# Patient Record
Sex: MALE | Race: WHITE | Employment: FULL TIME | URBAN - METROPOLITAN AREA
[De-identification: names, ages, dates, MRNs, and addresses within clinical notes are randomized per-mention and may not be internally consistent; named-entity substitution may affect disease eponyms.]

---

## 2022-03-07 ENCOUNTER — TRANSFERRED RECORDS (OUTPATIENT)
Dept: HEALTH INFORMATION MANAGEMENT | Facility: CLINIC | Age: 32
End: 2022-03-07

## 2022-11-03 NOTE — TELEPHONE ENCOUNTER
DIAGNOSIS: (R) shoulder, surgical consult / Dr. Julián Becker @ [facility] / [place] / MRI @ [facility], no previous surgery   APPOINTMENT DATE: 12.5.22   NOTES STATUS DETAILS   OFFICE NOTE from referring provider Care Everywhere  [facility]- sent to scan 11/10 10.31.22 Dr Julián Becker, [facility]  3.21.22   LABS     MRI PACS      Action 11.3.22 1:16 PM [facility]   Action Taken Faxed request to [facility] for images [phone]     Action 11.10.22 3:18 PM    Action Taken Received disc for mri 3.7.22 R shoulder images from [facility]. Gave disc to Luc for processing.     Received records for mri 3.7.22 R shoulder from [facility]. Sent to scan.

## 2022-11-21 DIAGNOSIS — M25.511 CHRONIC RIGHT SHOULDER PAIN: Primary | ICD-10-CM

## 2022-11-21 DIAGNOSIS — G89.29 CHRONIC RIGHT SHOULDER PAIN: Primary | ICD-10-CM

## 2022-12-01 NOTE — TELEPHONE ENCOUNTER
FUTURE VISIT INFORMATION      SURGERY INFORMATION:    Date: 12/8/22- Ortho    RECORDS REQUESTED FROM:         Pertinent Medical History: None

## 2022-12-02 DIAGNOSIS — G89.29 CHRONIC RIGHT SHOULDER PAIN: Primary | ICD-10-CM

## 2022-12-02 DIAGNOSIS — M25.511 CHRONIC RIGHT SHOULDER PAIN: Primary | ICD-10-CM

## 2022-12-05 ENCOUNTER — ANCILLARY PROCEDURE (OUTPATIENT)
Dept: GENERAL RADIOLOGY | Facility: CLINIC | Age: 32
End: 2022-12-05
Attending: ORTHOPAEDIC SURGERY
Payer: COMMERCIAL

## 2022-12-05 ENCOUNTER — TELEPHONE (OUTPATIENT)
Dept: ORTHOPEDICS | Facility: CLINIC | Age: 32
End: 2022-12-05

## 2022-12-05 ENCOUNTER — PRE VISIT (OUTPATIENT)
Dept: ORTHOPEDICS | Facility: CLINIC | Age: 32
End: 2022-12-05

## 2022-12-05 ENCOUNTER — OFFICE VISIT (OUTPATIENT)
Dept: ORTHOPEDICS | Facility: CLINIC | Age: 32
End: 2022-12-05
Payer: COMMERCIAL

## 2022-12-05 VITALS — WEIGHT: 193.7 LBS | BODY MASS INDEX: 26.24 KG/M2 | HEIGHT: 72 IN

## 2022-12-05 DIAGNOSIS — G89.29 CHRONIC RIGHT SHOULDER PAIN: Primary | ICD-10-CM

## 2022-12-05 DIAGNOSIS — G89.29 CHRONIC RIGHT SHOULDER PAIN: ICD-10-CM

## 2022-12-05 DIAGNOSIS — M25.511 CHRONIC RIGHT SHOULDER PAIN: ICD-10-CM

## 2022-12-05 DIAGNOSIS — M25.511 CHRONIC RIGHT SHOULDER PAIN: Primary | ICD-10-CM

## 2022-12-05 PROCEDURE — 99204 OFFICE O/P NEW MOD 45 MIN: CPT | Performed by: ORTHOPAEDIC SURGERY

## 2022-12-05 PROCEDURE — 73030 X-RAY EXAM OF SHOULDER: CPT | Mod: RT | Performed by: RADIOLOGY

## 2022-12-05 NOTE — TELEPHONE ENCOUNTER
Patient is scheduled for surgery with Dr. Godinez    Spoke with: Tomas    Date of Surgery: 12/8/22    Location: [facility]    Informed patient they will need an adult  yes    Pre op with Provider n/a    H&P: Scheduled with PAC 12/7/22    Pre-procedure COVID-19 Test: n/a    Additional imaging/appointments: POP scheduled    Surgery packet: Given in clinic     Additional comments: n/a

## 2022-12-05 NOTE — PROGRESS NOTES
CHIEF CONCERN:  Right shoulder instability.    HISTORY OF PRESENT ILLNESS:  Mr. Livingston is a very pleasant right hand dominant 32-year-old with history of pain and disability in his right shoulder.  His problem started in college when he was playing football.  He elected to not treat his labral tear, which was diagnosed at that time surgically as he would miss his senior season.  He was able to play through the season and then did okay until 02/2022.      At that time, he was working out at the gym, doing some skull pressures when he started to have increasing pain.  His arm was effectively unusable afterwards.  He saw another doctor who referred him to Dr. Julián Becker.  Dr. Becker recommended a course of physical therapy, oral anti-inflammatory medications and intra-articular injection.  He completed these and noted that the injection helped him briefly.  He notes he was still quite limited in terms of his ability to restore activities and function.    He notes that he has pain when he reaches across his body and he cannot sleep on that side.  It affects his ability to be active at the gym or in the outdoors.    IMAGING:  Radiographs, AP and lateral of glenohumeral joint and lateral scapula.  Indication shoulder pain.  Comparison views none.  Demonstrate no evidence of fracture, dislocation or abnormal calcific focus.    My review of his 03/07/2022 MRI scan showed an intact rotator cuff.  He has anterior inferior labral tear and superior labral tear with some mild posterior atypical appearance, but no evidence of marielle labral tearing.  He has some signal changes in the articular surface of his glenohumeral joint, but no evidence of full-thickness cartilage loss.  There is some mild AC arthritis.    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    He enjoys hiking weights, running.  He did have an MCL injury this  summer.    MEDICATIONS:  None.    ALLERGIES:  No known drug allergies.    HABITS:  He does not smoke.    FAMILY HISTORY:  Negative for dislocating shoulders, dislocating kneecaps, rotator cuff tears or rheumatoid arthritis.    PHYSICAL EXAMINATION:  He is a well-developed male in mild to moderate distress.  He articulates and communicates well with normal affect.  His breathing is unlabored.  Extraocular movements are grossly intact.  His sensation is intact in the axillary, musculocutaneous, median, radial and ulnar nerve distribution.  He has a warm and well perfused hand with palpable radial pulse.  He has normal iron sweat patterns without evidence of skin breakdown.  No swelling or palpable lymphadenopathy in the shoulder and arm region.    He has 170 degrees of active forward elevation, 170 degrees of lateral elevation, 60 degrees of external rotation at the side and internal rotation of the back to 10 with a normal liftoff.  He has 5/5 forward elevator and external rotator and abductor strength.  He has a positive [place] that improves with supination localizes anteriorly and recreates his symptoms.  He has abnormal Azul and Speed.  He has a normal Yergason.  He has true apprehension in abduction, external rotation, which improved with relocation test.  He has a normal jerk test.    ASSESSMENT:    1.  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  2.  Right superior labral tear.    PLAN:  I had a lengthy talk with Mr. Livingston today.  We talked at length about surgical and nonsurgical treatment of his shoulder.  He certainly has had the majority of what options I think make sense for him to have tried in terms of surgical treatment.  He has failed physical therapy, anti-inflammatory medications and intra-articular injection.  He continues to have lifestyle-limiting discomfort.      We talked about surgical remediation in the form of arthroscopic Bankart repair with capsulorrhaphy if indicated and  likely superior labral repair.  I told him there were no guarantees of surgery, he could be no better or even worse, he could get stiff, infected, need for further surgery, injury to nerves, arteries that power the hand and arm, a reaction to the anesthetic with damage to the heart, lungs, brain and even death.      He demonstrated a good understanding of this and wished to proceed with surgical scheduling.  I look forward to seeing him back on the date of his surgery.      Tima Godinez MD     cc:    Julián Becker MD  [facility]

## 2022-12-05 NOTE — LETTER
Date:December 6, 2022      Patient was self referred, no letter generated. Do not send.        [facility] Information

## 2022-12-05 NOTE — NURSING NOTE
"Reason For Visit:   Chief Complaint   Patient presents with     Consult     (R) shoulder, surgical consult / Dr. Julián Becker @ [facility]       PCP: No primary care provider on file.  Ref: / Julián Becker    ?  No  Occupation [employer].  Currently working? Yes.  Work status?  Full time.  Date of injury: Tore labrum in College playing football - played through and after season did not need to have surgery so pushed it off.  Pain started up in Feb 2022 at a work out class doing skull crushers and started to have increased pain.    Date of surgery: NA  Type of surgery: NA.  Smoker: No  Request smoking cessation information: No    Rigth hand dominant    SANE score  Affected shoulder: Right  Right shoulder SANE: 60  Left shoulder SANE: 100    Ht 1.835 m (6' 0.24\")   Wt 87.9 kg (193 lb 11.2 oz)   BMI 26.09 kg/m        Pain Assessment  Patient Currently in Pain: Yes  0-10 Pain Scale: 4  Primary Pain Location: Shoulder (Right)  Pain Descriptors: Other (comment), Dull (unstable)  Alleviating Factors: Rest  Aggravating Factors: Movement, Lying, Other (comment), Exercise (sleeping, worse in the AM)    Sandra Escalona ATC        "

## 2022-12-05 NOTE — NURSING NOTE
Teaching Flowsheet   Relevant Diagnosis: Right shoulder pain   Teaching Topic: RIGHT SHOULDER ARTHROSCOPY WITH ARTHROSCOPIC BICEPS TENODESIS, POSSIBLE LABRAL REPAIR     Person(s) involved in teaching:   Patient     Motivation Level:  Asks Questions: Yes  Eager to Learn: Yes  Cooperative: Yes  Receptive (willing/able to accept information): Yes  Any cultural factors/Yazidi beliefs that may influence understanding or compliance? No  Comments: none     Patient demonstrates understanding of the following:  Reason for the appointment, diagnosis and treatment plan: Yes  Knowledge of proper use of medications and conditions for which they are ordered (with special attention to potential side effects or drug interactions): Yes  Which situations necessitate calling provider and whom to contact: Yes       Teaching Concerns Addressed:   Comments: none     Proper use and care of (medical equip, care aids, etc.): Yes  Nutritional needs and diet plan: Yes  Pain management techniques: Yes  Wound Care: Yes  How and/when to access community resources: Yes     Instructional Materials Used/Given: surgery packet, stoplight tool, Ultrasling IV handout, chlorhexidine     Time spent with patient: 15 minutes.    Leia Valentine RN on 12/5/2022 at 2:00 PM

## 2022-12-05 NOTE — LETTER
12/5/2022         RE: Tomas Livingston  [address]  [place] [address]        Dear Colleague,    Thank you for referring your patient, Tomas Livingston, to the [facility] [place]. Please see a copy of my visit note below.    CHIEF CONCERN:  Right shoulder instability.    HISTORY OF PRESENT ILLNESS:  Mr. Livingston is a very pleasant right hand dominant 32-year-old with history of pain and disability in his right shoulder.  His problem started in college when he was playing football.  He elected to not treat his labral tear, which was diagnosed at that time surgically as he would miss his senior season.  He was able to play through the season and then did okay until 02/2022.      At that time, he was working out at the gym, doing some skull pressures when he started to have increasing pain.  His arm was effectively unusable afterwards.  He saw another doctor who referred him to Dr. Julián Becker.  Dr. Becker recommended a course of physical therapy, oral anti-inflammatory medications and intra-articular injection.  He completed these and noted that the injection helped him briefly.  He notes he was still quite limited in terms of his ability to restore activities and function.    He notes that he has pain when he reaches across his body and he cannot sleep on that side.  It affects his ability to be active at the gym or in the outdoors.    IMAGING:  Radiographs, AP and lateral of glenohumeral joint and lateral scapula.  Indication shoulder pain.  Comparison views none.  Demonstrate no evidence of fracture, dislocation or abnormal calcific focus.    My review of his 03/07/2022 MRI scan showed an intact rotator cuff.  He has anterior inferior labral tear and superior labral tear with some mild posterior atypical appearance, but no evidence of marielle labral tearing.  He has some signal changes in the articular surface of his glenohumeral joint, but no evidence of  full-thickness cartilage loss.  There is some mild AC arthritis.    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    He enjoys hiking weights, running.  He did have an MCL injury this summer.    MEDICATIONS:  None.    ALLERGIES:  No known drug allergies.    HABITS:  He does not smoke.    FAMILY HISTORY:  Negative for dislocating shoulders, dislocating kneecaps, rotator cuff tears or rheumatoid arthritis.    PHYSICAL EXAMINATION:  He is a well-developed male in mild to moderate distress.  He articulates and communicates well with normal affect.  His breathing is unlabored.  Extraocular movements are grossly intact.  His sensation is intact in the axillary, musculocutaneous, median, radial and ulnar nerve distribution.  He has a warm and well perfused hand with palpable radial pulse.  He has normal iron sweat patterns without evidence of skin breakdown.  No swelling or palpable lymphadenopathy in the shoulder and arm region.    He has 170 degrees of active forward elevation, 170 degrees of lateral elevation, 60 degrees of external rotation at the side and internal rotation of the back to 10 with a normal liftoff.  He has 5/5 forward elevator and external rotator and abductor strength.  He has a positive [place] that improves with supination localizes anteriorly and recreates his symptoms.  He has abnormal Azul and Speed.  He has a normal Yergason.  He has true apprehension in abduction, external rotation, which improved with relocation test.  He has a normal jerk test.    ASSESSMENT:    1.  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  2.  Right superior labral tear.    PLAN:  I had a lengthy talk with Mr. Livingston today.  We talked at length about surgical and nonsurgical treatment of his shoulder.  He certainly has had the majority of what options I think make sense for him to have tried in terms of surgical  treatment.  He has failed physical therapy, anti-inflammatory medications and intra-articular injection.  He continues to have lifestyle-limiting discomfort.      We talked about surgical remediation in the form of arthroscopic Bankart repair with capsulorrhaphy if indicated and likely superior labral repair.  I told him there were no guarantees of surgery, he could be no better or even worse, he could get stiff, infected, need for further surgery, injury to nerves, arteries that power the hand and arm, a reaction to the anesthetic with damage to the heart, lungs, brain and even death.      He demonstrated a good understanding of this and wished to proceed with surgical scheduling.  I look forward to seeing him back on the date of his surgery.      Tima Godinez MD     cc:    Julián Becker MD  [facility]            Again, thank you for allowing me to participate in the care of your patient.        Sincerely,        Tima Godinez MD

## 2022-12-07 ENCOUNTER — ANESTHESIA EVENT (OUTPATIENT)
Dept: SURGERY | Facility: AMBULATORY SURGERY CENTER | Age: 32
End: 2022-12-07
Payer: COMMERCIAL

## 2022-12-07 ENCOUNTER — VIRTUAL VISIT (OUTPATIENT)
Dept: SURGERY | Facility: CLINIC | Age: 32
End: 2022-12-07
Payer: COMMERCIAL

## 2022-12-07 ENCOUNTER — PRE VISIT (OUTPATIENT)
Dept: SURGERY | Facility: CLINIC | Age: 32
End: 2022-12-07

## 2022-12-07 DIAGNOSIS — G89.29 CHRONIC RIGHT SHOULDER PAIN: ICD-10-CM

## 2022-12-07 DIAGNOSIS — Z01.818 PREOP EXAMINATION: Primary | ICD-10-CM

## 2022-12-07 DIAGNOSIS — S43.431S LABRAL TEAR OF SHOULDER, RIGHT, SEQUELA: ICD-10-CM

## 2022-12-07 DIAGNOSIS — M25.511 CHRONIC RIGHT SHOULDER PAIN: ICD-10-CM

## 2022-12-07 PROCEDURE — 99202 OFFICE O/P NEW SF 15 MIN: CPT | Mod: 95 | Performed by: NURSE PRACTITIONER

## 2022-12-07 RX ORDER — FENTANYL CITRATE 50 UG/ML
25 INJECTION, SOLUTION INTRAMUSCULAR; INTRAVENOUS
Status: CANCELLED | OUTPATIENT
Start: 2022-12-07

## 2022-12-07 ASSESSMENT — ENCOUNTER SYMPTOMS: ORTHOPNEA: 0

## 2022-12-07 ASSESSMENT — COPD QUESTIONNAIRES: COPD: 0

## 2022-12-07 ASSESSMENT — PAIN SCALES - GENERAL: PAINLEVEL: MODERATE PAIN (4)

## 2022-12-07 ASSESSMENT — LIFESTYLE VARIABLES: TOBACCO_USE: 0

## 2022-12-07 NOTE — H&P
Pre-Operative H & P     CC:  Preoperative exam to assess for increased cardiopulmonary risk while undergoing surgery and anesthesia.    Date of Encounter: 12/7/2022  Primary Care Physician:  No primary care provider on file.     Reason for visit:   Encounter Diagnoses   Name Primary?     Preop examination Yes     Chronic right shoulder pain      Labral tear of shoulder, right, sequela        HPI  Tomas Livingston is a 32 year old male who presents for pre-operative H & P in preparation for  Procedure Information     Case: [id number] Date/Time: 12/08/22 1205    Procedure: RIGHT SHOULDER ARTHROSCOPY WITH ARTHROSCOPIC BICEPS TENODESIS, POSSIBLE LABRAL REPAIR (Right: Shoulder)    Anesthesia type: Choice with Block    Diagnosis: Chronic right shoulder pain [M25.511, G89.29]    Pre-op diagnosis: Chronic right shoulder pain [M25.511, G89.29]    Location: [place] [address] / [facility]    Providers: Tima Godinez MD          Tomas Livingston is a 32 year old male with no chronic medical conditions other than chronic right shoulder pain.  He has a know labral tear.  He injured his right shoulder while playing college football approximately 10 years ago.  He opted to defer surgical treatment at that time so he could complete the season.  Despite attempting treatment with physical therapy, an injection and NSAIDS he has continued to have pain and subsequent limitations with his physical activity. He has recently consulted with Dr. Godinez and the above listed surgery has been recommended for treatment.      History is obtained from the patient and chart review    Hx of abnormal bleeding or anti-platelet use: none      Past Medical History  Past Medical History:   Diagnosis Date     Chronic right shoulder pain        Past Surgical History  Past Surgical History:   Procedure Laterality Date     DENTAL SURGERY      front teeth repaired       Prior to Admission Medications  No current  outpatient medications on file.       Allergies  Allergies   Allergen Reactions     Penicillins Unknown and Other (See Comments)     Was told as a child          Social History  Social History     Socioeconomic History     Marital status:      Spouse name: Not on file     Number of children: 0     Years of education: Not on file     Highest education level: Not on file   Occupational History     Occupation: health care technology   Tobacco Use     Smoking status: Never     Smokeless tobacco: Never   Substance and Sexual Activity     Alcohol use: Yes     Alcohol/week: 3.0 standard drinks     Types: 3 Standard drinks or equivalent per week     Drug use: Never     Sexual activity: Not on file   Other Topics Concern     Not on file   Social History Narrative     Not on file     Social Determinants of Health     Financial Resource Strain: Not on file   Food Insecurity: Not on file   Transportation Needs: Not on file   Physical Activity: Not on file   Stress: Not on file   Social Connections: Not on file   Intimate Partner Violence: Not on file   Housing Stability: Not on file       Family History  Family History   Problem Relation Age of Onset     Hypertension Father      Other - See Comments Father         unspecified carotid disease     Anesthesia Reaction No family hx of      Thrombosis No family hx of        Review of Systems  The complete review of systems is negative other than noted in the HPI or here.   Anesthesia Evaluation   Pt has had prior anesthetic. Type: MAC.    No history of anesthetic complications       ROS/MED HX  ENT/Pulmonary:  - neg pulmonary ROS  (-) tobacco use, asthma, COPD, JACQUELINE risk factors and recent URI   Neurologic:  - neg neurologic ROS     Cardiovascular:     (+) -----No previous cardiac testing  (-) MATTHEWS and orthopnea/PND   METS/Exercise Tolerance: >4 METS Comment: Is active with hiking, HIIT, running and weight training.    Hematologic:  - neg hematologic  ROS  (-) history of blood  clots and history of blood transfusion   Musculoskeletal: Comment: Chronic right shoulder pain - labral tear      GI/Hepatic:  - neg GI/hepatic ROS     Renal/Genitourinary:  - neg Renal ROS     Endo:  - neg endo ROS     Psychiatric/Substance Use:  - neg psychiatric ROS     Infectious Disease:  - neg infectious disease ROS  (-) Recent Fever   Malignancy:  - neg malignancy ROS     Other:  - neg other ROS          Virtual visit -  No vitals were obtained    Physical Exam  Constitutional: Awake, alert, cooperative, no apparent distress, and appears stated age.  Eyes: Pupils equal  HENT: Normocephalic  Respiratory: non labored breathing   Neurologic: Awake, alert, oriented to name, place and time.   Neuropsychiatric: Calm, cooperative. Normal affect.      Labs/Diagnostic Studies   All labs and imaging personally reviewed     EKG/ stress test - none    Labs:  No recent labs.  Will order creatinine, K+ and hgb for DOS.    The patient's records and results personally reviewed by this provider.     Outside records reviewed from: limited scanned records      Assessment      Tomas Livingston is a 32 year old male seen as a PAC referral for risk assessment and optimization for anesthesia.    Plan/Recommendations  Pt will be optimized for the proposed procedure.  See below for details on the assessment, risk, and preoperative recommendations    NEUROLOGY  - No history of TIA, CVA or seizure    -Post Op delirium risk factors:  No risk identified    ENT  - No current airway concerns.  Will need to be reassessed day of surgery.  Mallampati: Unable to assess  TM: Unable to assess    CARDIAC  - no noted cardiac history  - METS (Metabolic Equivalents) = >4  Patient performs 4 or more METS exercise without symptoms            Total Score: 0      RCRI-Very low risk: Class 1 0.4% complication rate            Total Score: 0        PULMONARY    JACQUELINE Low Risk            Total Score: 0      - Denies asthma or inhaler use  - Tobacco History   "    History   Smoking Status     Never   Smokeless Tobacco     Never       GI  - denies GERD  PONV Medium Risk  Total Score: 2           1 AN PONV: Patient is not a current smoker    1 AN PONV: Intended Post Op Opioids            ENDOCRINE    - BMI: Estimated body mass index is 26.09 kg/m  as calculated from the following:    Height as of 12/5/22: 1.835 m (6' 0.24\").    Weight as of 12/5/22: 87.9 kg (193 lb 11.2 oz).  Overweight (BMI 25.0-29.9)  - No history of Diabetes Mellitus    HEME  VTE Low Risk 0.5%            Total Score: 2    VTE: Male      - No history of abnormal bleeding or antiplatelet use.      MSK  - right shoulder labral tear and chronic pain.  Surgery planned as above.        Different anesthesia methods/types have been discussed with the patient, but they are aware that the final plan will be decided by the assigned anesthesia provider on the date of service.      The patient is optimized for their procedure. AVS with information on surgery time/arrival time, meds and NPO status given by nursing staff. No further diagnostic testing indicated.    Virtual visit - Please refer to the physical examination documented by the anesthesiologist in the anesthesia record on the day of surgery.    Video-Visit Details    Type of service:  Video Visit    Provider received verbal consent for a Video Visit from the patient? Yes    Video Start Time: 1234  Video End Time (time video stopped): 1244    Originating Location (pt. Location): Home    Distant Location (provider location): Off-site    Mode of Communication:  Video Conference via [employer]     On the day of service:     Prep time: 11 minutes  Visit time: 10 minutes  Documentation time: 5 minutes  ------------------------------------------  Total time: 26 minutes      JAREN Leo CNP  Preoperative Assessment Center  [facility]  Clinic and Surgery Center  Phone: [phone]  Fax: [phone]  "

## 2022-12-07 NOTE — PATIENT INSTRUCTIONS
Preparing for Your Surgery      Name:  Tomas Livingston   MRN:  [id number]   :  1990   Today's Date:  2022         Arriving for surgery:  Surgery date:  2022  Arrival time:  10:30 AM    Restrictions due to COVID 19:    2 visitors may accompany each patient  Visitors may wait for patient in the Surgery Center Waiting room  All visitors must wear a mask and socially distance        parking is available for anyone with mobility limitations or disabilities. (Monday- Friday 7 am- 5 pm)    Please come to:    [facility]  [address] [place] [id number]    Check in on the 5th floor, Ambulatory Surgery Center.    What can I eat or drink?    -  You may eat and drink normally until 8 hours before arrival time  (Until 2:30 AM)  -  You may have clear liquids until 2 hours before arrival time  (Until 8:30 AM)    Examples of clear liquids:  Water  Clear broth  Juices (apple, white grape, white cranberry  and cider) without pulp  Noncarbonated, powder based beverages  (lemonade and Ismael-Aid)  Sodas (Sprite, 7-Up, ginger ale and seltzer)  Coffee or tea (without milk or cream)  Gatorade    --No alcohol for at least 24 hours before surgery    Which medicines can I take?    Hold Aspirin for 7 days before surgery.   Hold Multivitamins for 7 days before surgery.  Hold Supplements for 7 days before surgery.  Hold Ibuprofen (Advil, Motrin) for 1 day before surgery--unless otherwise directed by surgeon.  Hold Naproxen (Aleve) for 4 days before surgery.        How do I prepare myself?  - Please take 2 showers before surgery using Scrubcare or Hibiclens soap.    Use this soap only from the neck to your toes.     Leave the soap on your skin for one minute--then rinse thoroughly.      You may use your own shampoo and conditioner; no other hair products.   - Please remove all jewelry and body piercings.  - No lotions, deodorants or fragrance.  - No makeup or fingernail polish.   -  Bring your ID and insurance card.    -If you have a Deep Brain Stimulator, a Spinal Cord Stimulator or any implanted Neuro device you must bring the remote to the Surgery Center          ALL PATIENTS ARE REQUIRED TO HAVE A RESPONSIBLE ADULT TO DRIVE AND BE IN ATTENDANCE WITH THEM FOR 24 HOURS FOLLOWING SURGERY       Covid testing policy as of 12/06/2022  Your surgeon will notify and schedule you for a COVID test if one is needed before surgery--please direct any questions or COVID symptoms to your surgeon      Questions or Concerns:    -For questions regarding the day of surgery please contact the Ambulatory Surgery Center at [phone].    -If you have health changes between today and your surgery please contact your surgeon.     For questions after surgery please call your surgeons office.

## 2022-12-07 NOTE — PROGRESS NOTES
Tomas is a 32 year old who is being evaluated via a billable video visit.      How would you like to obtain your AVS? Email: [email or web address]    HPI         Review of Systems         Objective    Vitals - Patient Reported  Pain Score: Moderate Pain (4)        Physical Exam         IRINA Dumont LPN

## 2022-12-08 ENCOUNTER — HOSPITAL ENCOUNTER (OUTPATIENT)
Facility: AMBULATORY SURGERY CENTER | Age: 32
Discharge: HOME | End: 2022-12-08
Attending: ORTHOPAEDIC SURGERY
Payer: COMMERCIAL

## 2022-12-08 ENCOUNTER — ANESTHESIA (OUTPATIENT)
Dept: SURGERY | Facility: AMBULATORY SURGERY CENTER | Age: 32
End: 2022-12-08
Payer: COMMERCIAL

## 2022-12-08 VITALS
DIASTOLIC BLOOD PRESSURE: 72 MMHG | TEMPERATURE: 97.5 F | HEIGHT: 72 IN | WEIGHT: 193 LBS | HEART RATE: 78 BPM | BODY MASS INDEX: 26.14 KG/M2 | RESPIRATION RATE: 16 BRPM | SYSTOLIC BLOOD PRESSURE: 159 MMHG | OXYGEN SATURATION: 98 %

## 2022-12-08 DIAGNOSIS — M25.511 CHRONIC RIGHT SHOULDER PAIN: Primary | ICD-10-CM

## 2022-12-08 DIAGNOSIS — G89.29 CHRONIC RIGHT SHOULDER PAIN: Primary | ICD-10-CM

## 2022-12-08 LAB
CREAT SERPL-MCNC: 1.22 MG/DL (ref 0.67–1.17)
GFR SERPL CREATININE-BSD FRML MDRD: 81 ML/MIN/1.73M2
HGB BLD-MCNC: 15.6 G/DL (ref 13.3–17.7)
POTASSIUM SERPL-SCNC: 4 MMOL/L (ref 3.4–5.3)

## 2022-12-08 PROCEDURE — 85018 HEMOGLOBIN: CPT | Performed by: PATHOLOGY

## 2022-12-08 PROCEDURE — 29806 SHO ARTHRS SRG CAPSULORRAPHY: CPT | Mod: RT | Performed by: ORTHOPAEDIC SURGERY

## 2022-12-08 PROCEDURE — 84132 ASSAY OF SERUM POTASSIUM: CPT | Performed by: PATHOLOGY

## 2022-12-08 PROCEDURE — 29806 SHO ARTHRS SRG CAPSULORRAPHY: CPT | Mod: RT

## 2022-12-08 PROCEDURE — 29828 SHO ARTHRS SRG BICP TENODSIS: CPT | Mod: RT

## 2022-12-08 PROCEDURE — 82565 ASSAY OF CREATININE: CPT | Performed by: PATHOLOGY

## 2022-12-08 PROCEDURE — 29828 SHO ARTHRS SRG BICP TENODSIS: CPT | Mod: RT | Performed by: ORTHOPAEDIC SURGERY

## 2022-12-08 PROCEDURE — C9290 INJ, BUPIVACAINE LIPOSOME: HCPCS

## 2022-12-08 DEVICE — IMP ANCHOR ARTHREX BC SWVLK TENODESIS 6.25X19.1MM AR-1662BC: Type: IMPLANTABLE DEVICE | Site: SHOULDER | Status: FUNCTIONAL

## 2022-12-08 DEVICE — ANCHOR SUTURE KNOTLESS FIBERTAK 2 OD1.8 MM GEN2 AR-3636: Type: IMPLANTABLE DEVICE | Site: SHOULDER | Status: FUNCTIONAL

## 2022-12-08 RX ORDER — IBUPROFEN 600 MG/1
600 TABLET, FILM COATED ORAL
Qty: 21 TABLET | Refills: 0 | Status: SHIPPED | OUTPATIENT
Start: 2022-12-08

## 2022-12-08 RX ORDER — FENTANYL CITRATE 50 UG/ML
25-50 INJECTION, SOLUTION INTRAMUSCULAR; INTRAVENOUS
Status: DISCONTINUED | OUTPATIENT
Start: 2022-12-08 | End: 2022-12-08 | Stop reason: HOSPADM

## 2022-12-08 RX ORDER — FENTANYL CITRATE 50 UG/ML
50 INJECTION, SOLUTION INTRAMUSCULAR; INTRAVENOUS EVERY 5 MIN PRN
Status: DISCONTINUED | OUTPATIENT
Start: 2022-12-08 | End: 2022-12-09 | Stop reason: HOSPADM

## 2022-12-08 RX ORDER — ONDANSETRON 4 MG/1
4 TABLET, ORALLY DISINTEGRATING ORAL EVERY 30 MIN PRN
Status: DISCONTINUED | OUTPATIENT
Start: 2022-12-08 | End: 2022-12-09 | Stop reason: HOSPADM

## 2022-12-08 RX ORDER — DEXAMETHASONE SODIUM PHOSPHATE 4 MG/ML
INJECTION, SOLUTION INTRA-ARTICULAR; INTRALESIONAL; INTRAMUSCULAR; INTRAVENOUS; SOFT TISSUE PRN
Status: DISCONTINUED | OUTPATIENT
Start: 2022-12-08 | End: 2022-12-08

## 2022-12-08 RX ORDER — NALOXONE HYDROCHLORIDE 0.4 MG/ML
0.4 INJECTION, SOLUTION INTRAMUSCULAR; INTRAVENOUS; SUBCUTANEOUS
Status: DISCONTINUED | OUTPATIENT
Start: 2022-12-08 | End: 2022-12-08 | Stop reason: HOSPADM

## 2022-12-08 RX ORDER — FLUMAZENIL 0.1 MG/ML
0.2 INJECTION, SOLUTION INTRAVENOUS
Status: DISCONTINUED | OUTPATIENT
Start: 2022-12-08 | End: 2022-12-08 | Stop reason: HOSPADM

## 2022-12-08 RX ORDER — CEFAZOLIN SODIUM 2 G/50ML
2 SOLUTION INTRAVENOUS SEE ADMIN INSTRUCTIONS
Status: DISCONTINUED | OUTPATIENT
Start: 2022-12-08 | End: 2022-12-08 | Stop reason: HOSPADM

## 2022-12-08 RX ORDER — PROPOFOL 10 MG/ML
INJECTION, EMULSION INTRAVENOUS PRN
Status: DISCONTINUED | OUTPATIENT
Start: 2022-12-08 | End: 2022-12-08

## 2022-12-08 RX ORDER — SODIUM CHLORIDE, SODIUM LACTATE, POTASSIUM CHLORIDE, CALCIUM CHLORIDE 600; 310; 30; 20 MG/100ML; MG/100ML; MG/100ML; MG/100ML
INJECTION, SOLUTION INTRAVENOUS CONTINUOUS
Status: DISCONTINUED | OUTPATIENT
Start: 2022-12-08 | End: 2022-12-09 | Stop reason: HOSPADM

## 2022-12-08 RX ORDER — MEPERIDINE HYDROCHLORIDE 25 MG/ML
12.5 INJECTION INTRAMUSCULAR; INTRAVENOUS; SUBCUTANEOUS
Status: DISCONTINUED | OUTPATIENT
Start: 2022-12-08 | End: 2022-12-09 | Stop reason: HOSPADM

## 2022-12-08 RX ORDER — GABAPENTIN 300 MG/1
300 CAPSULE ORAL 3 TIMES DAILY
Qty: 30 CAPSULE | Refills: 0 | Status: SHIPPED | OUTPATIENT
Start: 2022-12-08

## 2022-12-08 RX ORDER — SODIUM CHLORIDE, SODIUM LACTATE, POTASSIUM CHLORIDE, CALCIUM CHLORIDE 600; 310; 30; 20 MG/100ML; MG/100ML; MG/100ML; MG/100ML
INJECTION, SOLUTION INTRAVENOUS CONTINUOUS
Status: DISCONTINUED | OUTPATIENT
Start: 2022-12-08 | End: 2022-12-08 | Stop reason: HOSPADM

## 2022-12-08 RX ORDER — HYDROMORPHONE HYDROCHLORIDE 1 MG/ML
0.4 INJECTION, SOLUTION INTRAMUSCULAR; INTRAVENOUS; SUBCUTANEOUS EVERY 5 MIN PRN
Status: DISCONTINUED | OUTPATIENT
Start: 2022-12-08 | End: 2022-12-09 | Stop reason: HOSPADM

## 2022-12-08 RX ORDER — LIDOCAINE HYDROCHLORIDE 20 MG/ML
INJECTION, SOLUTION INFILTRATION; PERINEURAL PRN
Status: DISCONTINUED | OUTPATIENT
Start: 2022-12-08 | End: 2022-12-08

## 2022-12-08 RX ORDER — FENTANYL CITRATE 50 UG/ML
INJECTION, SOLUTION INTRAMUSCULAR; INTRAVENOUS PRN
Status: DISCONTINUED | OUTPATIENT
Start: 2022-12-08 | End: 2022-12-08

## 2022-12-08 RX ORDER — ACETAMINOPHEN 325 MG/1
975 TABLET ORAL ONCE
Status: DISCONTINUED | OUTPATIENT
Start: 2022-12-08 | End: 2022-12-08 | Stop reason: HOSPADM

## 2022-12-08 RX ORDER — EPHEDRINE SULFATE 50 MG/ML
INJECTION, SOLUTION INTRAVENOUS PRN
Status: DISCONTINUED | OUTPATIENT
Start: 2022-12-08 | End: 2022-12-08

## 2022-12-08 RX ORDER — HYDROXYZINE PAMOATE 25 MG/1
25 CAPSULE ORAL 3 TIMES DAILY PRN
Qty: 30 CAPSULE | Refills: 0 | Status: SHIPPED | OUTPATIENT
Start: 2022-12-08

## 2022-12-08 RX ORDER — BUPIVACAINE HYDROCHLORIDE 2.5 MG/ML
INJECTION, SOLUTION EPIDURAL; INFILTRATION; INTRACAUDAL
Status: COMPLETED | OUTPATIENT
Start: 2022-12-08 | End: 2022-12-08

## 2022-12-08 RX ORDER — NALOXONE HYDROCHLORIDE 0.4 MG/ML
0.2 INJECTION, SOLUTION INTRAMUSCULAR; INTRAVENOUS; SUBCUTANEOUS
Status: DISCONTINUED | OUTPATIENT
Start: 2022-12-08 | End: 2022-12-08 | Stop reason: HOSPADM

## 2022-12-08 RX ORDER — ACETAMINOPHEN 325 MG/1
975 TABLET ORAL ONCE
Status: COMPLETED | OUTPATIENT
Start: 2022-12-08 | End: 2022-12-08

## 2022-12-08 RX ORDER — PROPOFOL 10 MG/ML
INJECTION, EMULSION INTRAVENOUS CONTINUOUS PRN
Status: DISCONTINUED | OUTPATIENT
Start: 2022-12-08 | End: 2022-12-08

## 2022-12-08 RX ORDER — FENTANYL CITRATE 50 UG/ML
25 INJECTION, SOLUTION INTRAMUSCULAR; INTRAVENOUS EVERY 5 MIN PRN
Status: DISCONTINUED | OUTPATIENT
Start: 2022-12-08 | End: 2022-12-09 | Stop reason: HOSPADM

## 2022-12-08 RX ORDER — OXYCODONE HYDROCHLORIDE 5 MG/1
1-2 CAPSULE ORAL EVERY 4 HOURS PRN
Qty: 22 CAPSULE | Refills: 0 | Status: SHIPPED | OUTPATIENT
Start: 2022-12-08

## 2022-12-08 RX ORDER — CEFAZOLIN SODIUM 2 G/50ML
2 SOLUTION INTRAVENOUS
Status: COMPLETED | OUTPATIENT
Start: 2022-12-08 | End: 2022-12-08

## 2022-12-08 RX ORDER — HYDROMORPHONE HYDROCHLORIDE 1 MG/ML
0.2 INJECTION, SOLUTION INTRAMUSCULAR; INTRAVENOUS; SUBCUTANEOUS EVERY 5 MIN PRN
Status: DISCONTINUED | OUTPATIENT
Start: 2022-12-08 | End: 2022-12-09 | Stop reason: HOSPADM

## 2022-12-08 RX ORDER — GABAPENTIN 300 MG/1
300 CAPSULE ORAL
Status: COMPLETED | OUTPATIENT
Start: 2022-12-08 | End: 2022-12-08

## 2022-12-08 RX ORDER — OXYCODONE HYDROCHLORIDE 5 MG/1
5 TABLET ORAL ONCE
Status: COMPLETED | OUTPATIENT
Start: 2022-12-08 | End: 2022-12-08

## 2022-12-08 RX ORDER — ONDANSETRON 2 MG/ML
INJECTION INTRAMUSCULAR; INTRAVENOUS PRN
Status: DISCONTINUED | OUTPATIENT
Start: 2022-12-08 | End: 2022-12-08

## 2022-12-08 RX ORDER — ONDANSETRON 2 MG/ML
4 INJECTION INTRAMUSCULAR; INTRAVENOUS EVERY 30 MIN PRN
Status: DISCONTINUED | OUTPATIENT
Start: 2022-12-08 | End: 2022-12-09 | Stop reason: HOSPADM

## 2022-12-08 RX ORDER — LIDOCAINE 40 MG/G
CREAM TOPICAL
Status: DISCONTINUED | OUTPATIENT
Start: 2022-12-08 | End: 2022-12-08 | Stop reason: HOSPADM

## 2022-12-08 RX ORDER — KETOROLAC TROMETHAMINE 30 MG/ML
INJECTION, SOLUTION INTRAMUSCULAR; INTRAVENOUS PRN
Status: DISCONTINUED | OUTPATIENT
Start: 2022-12-08 | End: 2022-12-08

## 2022-12-08 RX ADMIN — LIDOCAINE HYDROCHLORIDE 40 MG: 20 INJECTION, SOLUTION INFILTRATION; PERINEURAL at 13:30

## 2022-12-08 RX ADMIN — ONDANSETRON 4 MG: 2 INJECTION INTRAMUSCULAR; INTRAVENOUS at 13:43

## 2022-12-08 RX ADMIN — FENTANYL CITRATE 50 MCG: 50 INJECTION, SOLUTION INTRAMUSCULAR; INTRAVENOUS at 12:14

## 2022-12-08 RX ADMIN — PROPOFOL 200 MCG/KG/MIN: 10 INJECTION, EMULSION INTRAVENOUS at 13:30

## 2022-12-08 RX ADMIN — FENTANYL CITRATE 25 MCG: 50 INJECTION, SOLUTION INTRAMUSCULAR; INTRAVENOUS at 13:51

## 2022-12-08 RX ADMIN — DEXAMETHASONE SODIUM PHOSPHATE 4 MG: 4 INJECTION, SOLUTION INTRA-ARTICULAR; INTRALESIONAL; INTRAMUSCULAR; INTRAVENOUS; SOFT TISSUE at 13:43

## 2022-12-08 RX ADMIN — PROPOFOL 200 MG: 10 INJECTION, EMULSION INTRAVENOUS at 13:30

## 2022-12-08 RX ADMIN — Medication 50 MCG: at 14:05

## 2022-12-08 RX ADMIN — Medication 100 MCG: at 14:09

## 2022-12-08 RX ADMIN — FENTANYL CITRATE 25 MCG: 50 INJECTION, SOLUTION INTRAMUSCULAR; INTRAVENOUS at 13:28

## 2022-12-08 RX ADMIN — ACETAMINOPHEN 975 MG: 325 TABLET ORAL at 11:30

## 2022-12-08 RX ADMIN — Medication 50 MCG: at 13:50

## 2022-12-08 RX ADMIN — GABAPENTIN 300 MG: 300 CAPSULE ORAL at 11:30

## 2022-12-08 RX ADMIN — SODIUM CHLORIDE, SODIUM LACTATE, POTASSIUM CHLORIDE, CALCIUM CHLORIDE: 600; 310; 30; 20 INJECTION, SOLUTION INTRAVENOUS at 11:50

## 2022-12-08 RX ADMIN — FENTANYL CITRATE 50 MCG: 50 INJECTION, SOLUTION INTRAMUSCULAR; INTRAVENOUS at 14:40

## 2022-12-08 RX ADMIN — CEFAZOLIN SODIUM 2 G: 2 SOLUTION INTRAVENOUS at 13:21

## 2022-12-08 RX ADMIN — SODIUM CHLORIDE, SODIUM LACTATE, POTASSIUM CHLORIDE, CALCIUM CHLORIDE: 600; 310; 30; 20 INJECTION, SOLUTION INTRAVENOUS at 14:19

## 2022-12-08 RX ADMIN — OXYCODONE HYDROCHLORIDE 5 MG: 5 TABLET ORAL at 15:30

## 2022-12-08 RX ADMIN — BUPIVACAINE HYDROCHLORIDE 10 ML: 2.5 INJECTION, SOLUTION EPIDURAL; INFILTRATION; INTRACAUDAL at 12:14

## 2022-12-08 RX ADMIN — EPHEDRINE SULFATE 50 MG: 50 INJECTION, SOLUTION INTRAVENOUS at 14:05

## 2022-12-08 RX ADMIN — KETOROLAC TROMETHAMINE 30 MG: 30 INJECTION, SOLUTION INTRAMUSCULAR; INTRAVENOUS at 14:54

## 2022-12-08 RX ADMIN — Medication 100 MCG: at 14:02

## 2022-12-08 RX ADMIN — Medication 100 MCG: at 13:35

## 2022-12-08 ASSESSMENT — ENCOUNTER SYMPTOMS: ORTHOPNEA: 0

## 2022-12-08 ASSESSMENT — LIFESTYLE VARIABLES: TOBACCO_USE: 0

## 2022-12-08 ASSESSMENT — COPD QUESTIONNAIRES: COPD: 0

## 2022-12-08 NOTE — BRIEF OP NOTE
[facility] Brief Operative Note    Pre-operative diagnosis: Chronic right shoulder pain [M25.511, G89.29]   Post-operative diagnosis * No post-op diagnosis entered *  Labral tear, biceps disease   Procedure: Procedure(s):  RIGHT SHOULDER ARTHROSCOPY WITH ARTHROSCOPIC BICEPS TENODESIS, Anterior LABRAL REPAIR, SLAP repair   Surgeon(s): Surgeon(s) and Role:     * Tima Godinez MD - Primary     * German Baker MD - Resident - Assisting   Estimated blood loss: 25cc *    Specimens: * No specimens in log *   Findings: SLAP, anterior labral tear,

## 2022-12-08 NOTE — ANESTHESIA POSTPROCEDURE EVALUATION
Patient: Tomas Livingston    Procedure: Procedure(s):  RIGHT SHOULDER ARTHROSCOPY WITH ARTHROSCOPIC BICEPS TENODESIS, Anterior LABRAL REPAIR, SLAP repair       Anesthesia Type:  General    Note:  Disposition: Outpatient   Postop Pain Control: Uneventful            Sign Out: Well controlled pain   PONV: No   Neuro/Psych: Uneventful            Sign Out: Acceptable/Baseline neuro status   Airway/Respiratory: Uneventful            Sign Out: Acceptable/Baseline resp. status   CV/Hemodynamics: Uneventful            Sign Out: Acceptable CV status; No obvious hypovolemia; No obvious fluid overload   Other NRE: NONE   DID A NON-ROUTINE EVENT OCCUR? No           Last vitals:  Vitals Value Taken Time   /61 12/08/22 1530   Temp 36.1  C (96.9  F) 12/08/22 1515   Pulse 82 12/08/22 1530   Resp 16 12/08/22 1530   SpO2 98 % 12/08/22 1515       Electronically Signed By: Kimi Ayala MD  December 8, 2022  4:09 PM

## 2022-12-08 NOTE — ANESTHESIA PROCEDURE NOTES
Brachial plexus Procedure Note    Pre-Procedure   Staff -        Anesthesiologist:  Toña Rasheed MD       Performed By: anesthesiologist       Location: pre-op       Procedure Start/Stop Times: 12/8/2022 12:14 PM and 12/8/2022 12:25 PM       Pre-Anesthestic Checklist: patient identified, IV checked, site marked, risks and benefits discussed, informed consent, monitors and equipment checked, pre-op evaluation, at physician/surgeon's request and post-op pain management  Timeout:       Correct Patient: Yes        Correct Procedure: Yes        Correct Site: Yes        Correct Position: Yes        Correct Laterality: Yes        Site Marked: Yes  Procedure Documentation  Procedure: Brachial plexus       Diagnosis: POST OPERATIVE PAIN       Laterality: right       Patient Position: supine       Patient Prep/Sterile Barriers: sterile gloves, mask       Skin prep: Chloraprep (interscalene (superior trunk approach) approach).       Needle Type: short bevel and insulated       Needle Gauge: 21.        Needle Length (millimeters): 110        Ultrasound guided       1. Ultrasound was used to identify targeted nerve, plexus, vascular marker, or fascial plane and place a needle adjacent to it in real-time.       2. Ultrasound was used to visualize the spread of anesthetic in close proximity to the above referenced structure.       3. A permanent image is entered into the patient's record.    Assessment/Narrative         The placement was negative for: blood aspirated, painful injection and site bleeding       Paresthesias: No.       Bolus given via needle..        Secured via.        Insertion/Infusion Method: Single Shot       Complications: none       Injection made incrementally with aspirations every 5 mL.    Medication(s) Administered   Bupivacaine 0.25% PF (Infiltration) - Infiltration   10 mL - 12/8/2022 12:14:00 PM  Bupivacaine liposome (Exparel) 1.3% LA inj susp (Infiltration) - Infiltration   10 mL - 12/8/2022  "12:14:00 PM  Medication Administration Time: 12/8/2022 12:14 PM     Comments:  133mg liposomal bupivacaine via interscalene brachial plexus block    Discussed risks of nerve block, including nerve injury, bleeding, infection, incomplete analgesia. Discussed anticipated areas of sensory and motor block, limb precautions, and fall precautions. Discussed interscalene block side effects, including phrenic nerve palsy. Discussed alternative of not performing a nerve block. Ensured understanding, invited questions and all questions were answered. Patient wishes to proceed.    Informed consent was obtained.   Patient tolerated well. Interscalene brachial plexus block, superior trunk approach. Incremental aspiration every 5 mL. No paresthesia, no heme. Needle tip visualized throughout with appropriate spread of local anesthetic around brachial plexus. Intact motor function immediately following block.   Block was placed at the surgeon's request for post operative pain control.          FOR [facility] ([facility]) ONLY:   Pain Team Contact information: please page the Pain Team Via [employer]. Search \"Pain\". During daytime hours, please page the attending first. At night please page the resident first.    "

## 2022-12-08 NOTE — OP NOTE
"Procedure Date: 12/08/2022    PREOPERATIVE DIAGNOSES:    1.  Right shoulder labral tear.  2.  Right shoulder biceps disease.    POSTOPERATIVE DIAGNOSES:  1.  Right shoulder superior labral tear.  2.  Right shoulder anterior labral tear.  3.  Right shoulder biceps tenosynovitis.    SURGICAL PROCEDURE:  1.  Right shoulder arthroscopic biceps tenodesis.  2.  Right shoulder arthroscopic superior labral tear repair.  3.  Right shoulder anterior labral tear repair (arthroscopic Bankart).    SURGEON:  Tima Godinez M.D.    ESTIMATED BLOOD LOSS:  Less than 25 mL.    IMPLANTS:    1.  Arthrex knotless FiberTak x 2.  2.  Arthrex 6.25 mm BioComposite SwiveLock anchor x 1.    INDICATIONS:  Mr. Livingston is a very pleasant 32-year-old man with a history of pain and disability in his shoulder.  He had an extensive trial of nonsurgical management that was adequate for his funding agency to provide prior authorization for this fund of benefit.  After discussion of risks and benefits of surgical versus nonsurgical management, he elected to proceed with surgical remediation.    DESCRIPTION OF PROCEDURE:  The patient was positively identified in the preanesthesia care area, surgical site was initialed by me, and his consent was reviewed with him.  He was then taken to the operating theater where he was placed a well-padded beach chair position, prepped and draped in the usual sterile fashion for right upper extremity surgery.    Prior to surgical initiation, a \"timeout\" was held in accordance with hospital policy.  Verbal verification of delivery of prophylactic intravenous antibiotics was performed.    Examination under anesthesia showed that he had symmetric posterior subluxation of 1 cm with 1 cm of anterior and 1 cm sulcus.    After establishment of a posterior viewing portal, an internal portal was made under direct visualization.  Diagnostic arthroscopy was then possible.  Findings as follows:  The upper portion of the " subscapularis was intact.  The biceps was abnormal at its origin with a fully detached superior labrum and tenosynovitic as it entered the bicipital groove.  The pouch was synovitic but devoid of loose bodies.  There is no HAGL.  Posterior labrum was intact with some splitting but no fraying.  The articular surface of the humerus and the glenoid had some grade 2 changes diffusely throughout with a small focal area of grade 3 changes on the posterior superior aspect of the humeral head.  The undersurface of the supraspinatus was intact.    I tagged the biceps tendon twice with 0 PDS sutures and amputated off the anterosuperior labrum.  I debrided the anterior, superior and posterior julia.  I debrided the undersurface of the superior labral tear.  This was so floppy that was kicking down into the glenohumeral joint.  Consequently, I placed a knotless FiberTak through a percutaneous approach and fixed this so that it would no longer slip down into the glenohumeral joint itself.  As I did so, it was clear that he had degenerated Fishers complex anteriorly.  He did have an area of focal full-thickness cartilage loss that was about 3 x 4 mm on the anterior aspect, just at the distal most aspect of his Howie complex.  Consequently, I placed a knotless FiberTak there and used the anterior labral tear to fill this defect.  I was able to do so with an anterior labral tear by passing with a 25-degree suture passing device and pulling it down and effecting excellent reapproximation.  Care was taken not to do a capsulorrhaphy, as he did not have significant instability complaints preoperatively.    After this, I turned my attention to the subacromial space.    Upon entering the subacromial space, massive and significant bursitis was encountered.  I debrided this with a shaver and an ablator.  I preserved the CA ligament.  I viewed from the anterolateral portal and made a low anterolateral portal under direct visualization.   There was no evidence of full-thickness rotator cuff tearing from above.    I opened the transverse humeral ligament and brought the biceps tendon out through the low anterolateral portal and placed a rotating retrograde migratory Bennett stitch before amputating 4 cm of diseased tendon.  The biceps was very difficult to pull out of the low anterolateral portal because it was incarcerated in the bicipital groove with significant scarring.  Ultimately, I was able to free it up by working longitudinally along it and lysing some of these adhesions before I could pull it out.  It seems that this explained a significant amount of his preoperative symptoms.    I placed the biceps tendon deep into a 6.5 mm drill hole that I drilled in the inferior aspect of the bicipital groove and held with a 6.25 mm BioComposite SwiveLock anchor.  The rest of the contents of the bicipital groove were debrided of all soft tissues using a shaver and an ablator.    I was then able copiously irrigate and close with 3-0 interrupted Monocryl sutures.  Steri-Strips and a sterile nonadherent dressing were applied.  A sling was placed.    POSTOPERATIVE PLAN:  1.  The patient will be discharged home today on oral analgesics.  He should have no active or passive range of motion at this time, except for Codman exercises.   2.  At the 2-3 week zhanna, he will begin gentle progressive 4-quadrant stretching.  3.  Total sling time will be 4 weeks.  4.  Between 4 and 8 weeks, he can begin activities of daily living, but no lifting greater than 10 pounds.  5.  At the 8-week zhnana, he will begin unrestricted 4-quadrant stretching, periscapular stabilization and strengthening.  Radiographs should be obtained at that visit.    Tima Godinez MD        D: 2022   T: 2022   MT: [id number]    Name:     DEIDRE BREWER  MRN:      [id number]        Account:        [id number]   :      1990           Procedure Date: 2022      Document: [id number]

## 2022-12-08 NOTE — ANESTHESIA CARE TRANSFER NOTE
Patient: Tomas Livingston    Procedure: Procedure(s):  RIGHT SHOULDER ARTHROSCOPY WITH ARTHROSCOPIC BICEPS TENODESIS, Anterior LABRAL REPAIR, SLAP repair       Diagnosis: Chronic right shoulder pain [M25.511, G89.29]  Diagnosis Additional Information: No value filed.    Anesthesia Type:   General     Note:    Oropharynx: oropharynx clear of all foreign objects and spontaneously breathing  Level of Consciousness: awake  Oxygen Supplementation: room air    Independent Airway: airway patency satisfactory and stable  Dentition: dentition unchanged  Vital Signs Stable: post-procedure vital signs reviewed and stable  Report to RN Given: handoff report given  Patient transferred to: Phase II  Comments: Report to Phase II RN. Resps easy and regular.   Handoff Report: Identifed the Patient, Identified the Reponsible Provider, Reviewed the pertinent medical history, Discussed the surgical course, Reviewed Intra-OP anesthesia mangement and issues during anesthesia, Set expectations for post-procedure period and Allowed opportunity for questions and acknowledgement of understanding      Vitals:  Vitals Value Taken Time   /66    Temp 36.1  C (96.9  F) 12/08/22 1515   Pulse 83 12/08/22 1515   Resp 14 12/08/22 1515   SpO2 98 % 12/08/22 1515       Electronically Signed By: JAREN MOSQUEDA CRNA  December 8, 2022  3:19 PM

## 2022-12-08 NOTE — ANESTHESIA PREPROCEDURE EVALUATION
Anesthesia Pre-Procedure Evaluation    Patient: Tomas Livingston   MRN: [id number] : 1990        Procedure : Procedure(s):  RIGHT SHOULDER ARTHROSCOPY WITH ARTHROSCOPIC BICEPS TENODESIS, POSSIBLE LABRAL REPAIR          Past Medical History:   Diagnosis Date     Chronic right shoulder pain       Past Surgical History:   Procedure Laterality Date     DENTAL SURGERY      front teeth repaired      Allergies   Allergen Reactions     Penicillins Unknown and Other (See Comments)     Was told as a child         Social History     Tobacco Use     Smoking status: Never     Smokeless tobacco: Never   Substance Use Topics     Alcohol use: Yes     Alcohol/week: 3.0 standard drinks     Types: 3 Standard drinks or equivalent per week      Wt Readings from Last 1 Encounters:   22 87.9 kg (193 lb 11.2 oz)        Anesthesia Evaluation   Pt has had prior anesthetic. Type: MAC.    No history of anesthetic complications       ROS/MED HX  ENT/Pulmonary:    (-) tobacco use, asthma, COPD, JACQUELINE risk factors and recent URI   Neurologic:       Cardiovascular:    (-) MATTHEWS, orthopnea/PND and syncope   METS/Exercise Tolerance: >4 METS Comment: Is active with hiking, HIIT, running and weight training.    Hematologic:    (-) history of blood clots and history of blood transfusion   Musculoskeletal: Comment: Chronic right shoulder pain - labral tear      GI/Hepatic:    (-) GERD   Renal/Genitourinary:       Endo:       Psychiatric/Substance Use:       Infectious Disease:    (-) Recent Fever   Malignancy:       Other:            Physical Exam    Airway        Mallampati: I   TM distance: > 3 FB   Neck ROM: full   Mouth opening: > 3 cm    Respiratory Devices and Support         Dental         B=Bridge, C=Chipped, L=Loose, M=Missing    Cardiovascular          Rhythm and rate: regular and normal     Pulmonary           breath sounds clear to auscultation           OUTSIDE LABS:  CBC: No results found for: WBC, HGB, HCT, PLT  BMP: No results  found for: NA, POTASSIUM, CHLORIDE, CO2, BUN, CR, GLC  COAGS: No results found for: PTT, INR, FIBR  POC: No results found for: BGM, HCG, HCGS  HEPATIC: No results found for: ALBUMIN, PROTTOTAL, ALT, AST, GGT, ALKPHOS, BILITOTAL, BILIDIRECT, BAUDILIO  OTHER: No results found for: PH, LACT, A1C, MILENA, PHOS, MAG, LIPASE, AMYLASE, TSH, T4, T3, CRP, SED    Anesthesia Plan    ASA Status:  1   NPO Status:  NPO Appropriate    Anesthesia Type: General.     - Airway: LMA   Induction: Intravenous.   Maintenance: Balanced.        Consents    Anesthesia Plan(s) and associated risks, benefits, and realistic alternatives discussed. Questions answered and patient/representative(s) expressed understanding.    - Discussed:     - Discussed with:  Patient         Postoperative Care    Pain management: IV analgesics, Oral pain medications, Peripheral nerve block (Single Shot).   PONV prophylaxis: Ondansetron (or other 5HT-3), Dexamethasone or Solumedrol, Background Propofol Infusion     Comments:    Other Comments: Preoperative interscalene brachial plexus block with liposomal bupivacaine, per surgeon request. Discussed risks of nerve block, including nerve injury, bleeding, infection, incomplete analgesia. Discussed anticipated areas of sensory and motor block, limb precautions, and fall precautions.  Discussed interscalene block side effects, including phrenic nerve palsy. Discussed alternative of not performing a nerve block. Ensured understanding, invited questions and all questions were answered. Patient wishes to proceed.    Discussed risks of general anesthesia, including aspiration pneumonia, sore throat/hoarse voice, abrasions/damage to lips/tongue/teeth, nausea, rare complications (including medication reactions, cardiac, pulmonary). Ensured understanding, invited questions and all questions were answered. Patient wishes to proceed.       H&P reviewed: Unable to attach H&P to encounter due to EHR limitations. H&P Update:  appropriate H&P reviewed, patient examined. No interval changes since H&P (within 30 days).         Toña Rasheed MD

## 2022-12-08 NOTE — DISCHARGE INSTRUCTIONS
"[facility]  Home Care Following Anesthesia  For 24 hours after surgery:  Get plenty of rest.  A responsible adult must stay with you for at least 24 hours after you leave the surgery center.  Do not drive or use heavy equipment.  If you have weakness or tingling, don't drive or use heavy equipment until this feeling goes away.   Do not drink alcohol.   Avoid strenuous or risky activities.  Ask for help when climbing stairs.  You may feel lightheaded.  IF so, sit for a few minutes before standing.  Have someone help you get up.   If you have nausea (feel sick to your stomach): Drink only clear liquids such as apple juice, ginger ale, broth or 7-Up.  Rest may also help.  Be sure to drink enough fluids.  Move to a regular diet as you feel able.   You may have a slight fever.  Call the doctor if your fever is over 100 F (37.7 C) (taken under the tongue) or lasts longer than 24 hours.  You may have a dry mouth, a sore throat, muscle aches or trouble sleeping. These should go away after 24 hours.  Do not make important or legal decisions.   It is recommended to avoid smoking.      Today you received an Exparel block to numb the nerves near your surgery site.  This is a block using local anesthetic or \"numbing\" medication injected around the nerves to anesthetize or \"numb\" the area supplied by those nerves.  This block is injected into the muscle layer near your surgical site.  This medication may numb the location where you had surgery up to 72 hours.  If your surgical site is an arm or leg you should be careful with your affected limb, since it is possible to injure your limb without being aware of it due to the numbing.  Until full feeling returns, you should guard against bumping or hitting your limb, and avoid extreme hot or cold temperatures on the skin.  As the block wears off, the feeling will return as a tingling or prickly sensation near your surgical site.  You will " experince more discomfort from your incision as the feeling returns.  You may want to take a pain pill (a narcotic or Tylenol if this was prescribed by your surgeon) when you start to experience mild pain before the pain beomes more severe.  If your pain medications do not control your pain, you should notify your surgeon.    Tips for taking pain medications  To get the best pain relief possible, remember these points:  Take pain medications as directed, before pain becomes severe.  Pain medication can upset your stomach: taking it with food may help.  Constipation is a common side effect of pain medication. Drink plenty of  fluids.  Eat foods high in fiber. Take a stool softener if recommended by your doctor or pharmacist.  Do not drink alcohol, drive or operate machinery while taking pain medications.  Ask about other ways to control pain, such as with heat, ice or relaxation.    Tylenol/Acetaminophen Consumption  To help encourage the safe use of acetaminophen, the makers of TYLENOL  have lowered the maximum daily dose for single-ingredient Extra Strength TYLENOL  (acetaminophen) products sold in the [place] from 8 pills per day (4,000 mg) to 6 pills per day (3,000 mg). The dosing interval has also changed from 2 pills every 4-6 hours to 2 pills every 6 hours.  If you feel your pain relief is insufficient, you may take Tylenol/Acetaminophen in addition to your narcotic pain medication.   Be careful not to exceed 3,000 mg of Tylenol/Acetaminophen in a 24 hour period from all sources.  If you are taking extra strength Tylenol/acetaminophen (500 mg), the maximum dose is 6 tablets in 24 hours.  If you are taking regular strength acetaminophen (325 mg), the maximum dose is 9 tablets in 24 hours.  You received 975 mg of tylenol at 11:30 AM, next dose available after 5:30 PM- then just follow the package instructions    Call a doctor for any of the following:  Signs of infection (fever, growing tenderness at the surgery  site, a large amount of drainage or bleeding, severe pain, foul-smelling drainage, redness, swelling).  It has been over 8 to 10 hours since surgery and you are still not able to urinate (pass water).  Headache for over 24 hours.  Signs of Covid-19 infection (temperature over 100 degrees, shortness of breath, cough, loss of taste/smell, generalized body aches, persistent headache, chills, sore throat, nausea/vomiting/diarrhea)  Your doctor is:  Dr. Tima Godinez, Orthopaedics: [phone]                Or dial [phone] and ask for the resident on call for:  Orthopaedics  For emergency care, call the:  [facility] Emergency Department: [phone] (TTY for hearing impaired: [phone])

## 2022-12-08 NOTE — RESULT ENCOUNTER NOTE
Adolph Diez-  I just realized that Tomas doesn't have MyChart.  When you are able, can you please call him with the below message?    Thanks!  Reva Marin,    Your test results are attached.  Your labs in pre-op today showed a slightly elevated creatinine level (kidney lab).  It may have been related to some dehydration.  I would recommend that you follow up with your primary care provider for a recheck when you can hydrate well beforehand.        Reva Mujica DNP, RN, ANP-C

## 2022-12-08 NOTE — ANESTHESIA PROCEDURE NOTES
Airway       Patient location during procedure: OR  Staff -        CRNA: Nhi Guthrie APRN CRNA       Performed By: CRNAIndications and Patient Condition       Indications for airway management: jeana-procedural       Induction type:intravenous       Mask difficulty assessment: 1 - vent by mask    Final Airway Details       Final airway type: supraglottic airway    Supraglottic Airway Details        Type: LMA       Brand: I-Gel       LMA size: 5    Post intubation assessment        Placement verified by: capnometry, equal breath sounds and chest rise        Number of attempts at approach: 1       Number of other approaches attempted: 0       Secured with: silk tape       Ease of procedure: easy       Dentition: Intact and Unchanged

## 2022-12-08 NOTE — OR NURSING
Patient received right side Brachial Plexus nerve block  with Exparel.  Fentanyl 50mcg and Versed 1mg given. Tolerated procedure well.

## 2022-12-09 ENCOUNTER — TELEPHONE (OUTPATIENT)
Dept: SURGERY | Facility: CLINIC | Age: 32
End: 2022-12-09

## 2022-12-09 NOTE — TELEPHONE ENCOUNTER
Updated Tomas of his lab results.  Per Reva Mujica NP: labs showed slightly elevated creatinine level, may have been related to some dehydration.  Recommend follow up with PCP for a recheck and hydrate well before hand. Tomas expressed understanding.  Georgie Rao RN

## 2022-12-30 ENCOUNTER — OFFICE VISIT (OUTPATIENT)
Dept: ORTHOPEDICS | Facility: CLINIC | Age: 32
End: 2022-12-30
Payer: COMMERCIAL

## 2022-12-30 DIAGNOSIS — G89.29 CHRONIC RIGHT SHOULDER PAIN: Primary | ICD-10-CM

## 2022-12-30 DIAGNOSIS — M25.511 CHRONIC RIGHT SHOULDER PAIN: Primary | ICD-10-CM

## 2022-12-30 PROCEDURE — 99024 POSTOP FOLLOW-UP VISIT: CPT | Performed by: PHYSICIAN ASSISTANT

## 2022-12-30 NOTE — LETTER
Date:January 2, 2023      Patient was self referred, no letter generated. Do not send.        [facility] Health Information

## 2022-12-30 NOTE — NURSING NOTE
Reason For Visit:   Chief Complaint   Patient presents with     Surgical Followup     Post op right shoulder Arthroscopic Tenodesis, Superior Labral repair, Anterior Labral repair (Bankart)  DOS: 12/8/22       Primary MD: No primary care provider on file.  Ref. MD: Summer    Age: 32 year old    ?  No      There were no vitals taken for this visit.      Pain Assessment  Patient Currently in Pain: Yes  0-10 Pain Scale: 3 (at rest  7/10 with odd movements)  Primary Pain Location: Shoulder (right)    Hand Dominance Evaluation  Hand Dominance: Right          QuickDASH Assessment  No flowsheet data found.       Current Outpatient Medications   Medication Sig Dispense Refill     gabapentin (NEURONTIN) 300 MG capsule Take 1 capsule (300 mg) by mouth 3 times daily 30 capsule 0     hydrOXYzine (VISTARIL) 25 MG capsule Take 1 capsule (25 mg) by mouth 3 times daily as needed for itching 30 capsule 0     ibuprofen (ADVIL/MOTRIN) 600 MG tablet Take 1 tablet (600 mg) by mouth 3 times daily (with meals) 21 tablet 0     oxyCODONE (OXY-IR) 5 MG capsule Take 1-2 capsules (5-10 mg) by mouth every 4 hours as needed for severe pain (7-10) 22 capsule 0       Allergies   Allergen Reactions     Penicillins Unknown and Other (See Comments)     Was told as a child          DAYNE THAKUR, ATC

## 2022-12-30 NOTE — PATIENT INSTRUCTIONS
Please schedule a physical therapy appointment. Start 4 gentle progressive 4-quadrant stretching. Continue sling until 4 weeks post op (1/5/23).     At 4 weeks post op my discontinue sling. Between 4 and 8 weeks, you can begin activities of daily living, but no lifting greater than 10 pounds.     Follow up in 1/25/23 with Ana Zamorano for recheck. At that time we will get X-rays and you will begin unrestricted 4-quadrant stretching, periscapular stabilization and strengthening.

## 2022-12-30 NOTE — PROGRESS NOTES
Date of Service: Dec 30, 2022    Chief Complaint: Post operative follow up.     Date of Surgery: 12/8/2022    Procedure Performed:   1.  Right shoulder arthroscopic biceps tenodesis.  2.  Right shoulder arthroscopic superior labral tear repair.  3.  Right shoulder anterior labral tear repair (arthroscopic Bankart).     Interval events: Tomas Livingston is a 32 year old male who presents today for a postoperative follow up.  Patient reports he is doing well.  He has been wearing his sling at all times except for hygiene.  He is doing gentle pendulum exercises.  Patient did report that he recently got a new puppy and there have been a few instances where he reflexively used his right arm, he has had some soreness and tightness in the mid biceps muscle belly.  Denies any numbness or tingling.  He has not yet seen physical therapy.    The past medical history was reviewed updated in the EMR. This includes medications, surgeries, social history, and review of systems.    Physical examination:  Well-developed, well-nourished and in no acute distress.  Alert and oriented to surroundings.  On examination of the right shoulder, incision is well-healed. There is no erythema, drainage, or dehiscence. Sensation is intact in median, radial and ulnar nerve distributions. Patient can actively flex and extend all digits and thumb. Fingers are warm and well-perfused.     Assessment: 32 year old male s/p the above procedures, progressing appropriately.     Plan:    -Physical therapy referral placed.  Encourage patient to schedule as soon as possible.  - Patient may initiate gentle progressive 4-quadrant stretching.  Handout given for four-quadrant stretching.  - He should continue with the sling until 4 weeks post op.   - Between 4 and 8 weeks, he can begin activities of daily living, but no lifting greater than 10 pounds.   - Patient will follow up at 8 weeks post op with XRs of the shoulder obtained at that time. At that time he  may begin unrestricted 4-quadrant stretching, periscapular stabilization and strengthening.  -Follow-up on 1/5/2023 with Ana Regan for recheck.  Knows to contact us in the interim with any questions or concerns.    GIANA JOHNSON PA-C  December 30, 2022 9:14 AM   Orthopaedic Surgery

## 2022-12-30 NOTE — LETTER
12/30/2022         RE: Tomas Livingston  [address]  [place] [address]        Dear Colleague,    Thank you for referring your patient, Tomas Livingston, to the [facility] [place]. Please see a copy of my visit note below.    Date of Service: Dec 30, 2022    Chief Complaint: Post operative follow up.     Date of Surgery: 12/8/2022    Procedure Performed:   1.  Right shoulder arthroscopic biceps tenodesis.  2.  Right shoulder arthroscopic superior labral tear repair.  3.  Right shoulder anterior labral tear repair (arthroscopic Bankart).     Interval events: Tomas Livingston is a 32 year old male who presents today for a postoperative follow up.  Patient reports he is doing well.  He has been wearing his sling at all times except for hygiene.  He is doing gentle pendulum exercises.  Patient did report that he recently got a new puppy and there have been a few instances where he reflexively used his right arm, he has had some soreness and tightness in the mid biceps muscle belly.  Denies any numbness or tingling.  He has not yet seen physical therapy.    The past medical history was reviewed updated in the EMR. This includes medications, surgeries, social history, and review of systems.    Physical examination:  Well-developed, well-nourished and in no acute distress.  Alert and oriented to surroundings.  On examination of the right shoulder, incision is well-healed. There is no erythema, drainage, or dehiscence. Sensation is intact in median, radial and ulnar nerve distributions. Patient can actively flex and extend all digits and thumb. Fingers are warm and well-perfused.     Assessment: 32 year old male s/p the above procedures, progressing appropriately.     Plan:    -Physical therapy referral placed.  Encourage patient to schedule as soon as possible.  - Patient may initiate gentle progressive 4-quadrant stretching.  Handout given for four-quadrant  stretching.  - He should continue with the sling until 4 weeks post op.   - Between 4 and 8 weeks, he can begin activities of daily living, but no lifting greater than 10 pounds.   - Patient will follow up at 8 weeks post op with XRs of the shoulder obtained at that time. At that time he may begin unrestricted 4-quadrant stretching, periscapular stabilization and strengthening.  -Follow-up on 1/5/2023 with Ana Regan for recheck.  Knows to contact us in the interim with any questions or concerns.    TIKI JOHNSON PA-C  December 30, 2022 9:14 AM   Orthopaedic Surgery        Again, thank you for allowing me to participate in the care of your patient.        Sincerely,        Tiki Johnson PA-C

## 2023-01-05 ENCOUNTER — THERAPY VISIT (OUTPATIENT)
Dept: PHYSICAL THERAPY | Facility: CLINIC | Age: 33
End: 2023-01-05
Attending: PHYSICIAN ASSISTANT
Payer: COMMERCIAL

## 2023-01-05 DIAGNOSIS — G89.29 CHRONIC RIGHT SHOULDER PAIN: ICD-10-CM

## 2023-01-05 DIAGNOSIS — M25.511 CHRONIC RIGHT SHOULDER PAIN: ICD-10-CM

## 2023-01-05 PROCEDURE — 97110 THERAPEUTIC EXERCISES: CPT | Mod: GP | Performed by: PHYSICAL THERAPIST

## 2023-01-05 PROCEDURE — 97161 PT EVAL LOW COMPLEX 20 MIN: CPT | Mod: GP | Performed by: PHYSICAL THERAPIST

## 2023-01-05 NOTE — PROGRESS NOTES
[facility] Initial Evaluation     Present: The history is provided by the patient. No  was used.      Subjective:  Tomas Livingston is a 32 year old male who present to PT s/p right anthroscopic surgery to repair anterior and superior labrum with biceps tenodesis on 12/8/22. Pt reports that he wore the sling for the first 4 weeks, today is his first day without it.     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 Location of symptoms: right shoulder. Pain level on number scale: 4/10. Quality of pain: achy, tired, sore. Associated symptoms: denies numbness/tingling. Pain frequency (constant/intermittent): intermittent. Symptoms are exacerbated by: movement, activity. Symptoms are relieved by: movement. Progression of symptoms since onset: improving. Imaging: see Epic. Previous treatment/response: PT previously for shoulder.      General health as reported by patient is good. Pertinent medical history includes: See Epic. Medical allergies: see Epic. Other pertinent surgeries: see Epic. Current medications: See Epic.     Occupation: Healthcare IT sales, artificial intelligence. Primary job tasks: computer work, works from home with travel. Barriers at home/work: None reported by patient.     Social history: HIIT classes - [employer]; travels for work; right-handed, sleeps on right side    Red flags:  None reported by patient.    Objective  Observation:  Posture: bilateral rounded/protracted shoulders    Scapular positioning: WFL    *=painful    Shoulder AROM (* = pain) Right Left   FL 80*        160   ABD    ER/HBH Right ear T4   IR/HBB Right hip L2     Shoulder Strength: Not tested due to 4-weeks status post surgery      Key Findings  Patient presents to physical therapy following right shoulder anterior and superior labral repairs with biceps tenodesis on 12/8/22;  decreased strength and ROM consistent with status post surgery      Assessment/Plan:    Patient is a 32 year old male with right side shoulder complaints.    Patient has the following significant findings with corresponding treatment plan.                Diagnosis 1:  Right shoulder  Pain -  self management, education and home program  Decreased ROM/flexibility - manual therapy, therapeutic exercise, therapeutic activity and home program    Therapy Evaluation Codes:     Cumulative Therapy Evaluation is: Low complexity.    Previous and current functional limitations:  (See Goal Flow Sheet for this information)    Short term and Long term goals: (See Goal Flow Sheet for this information)     Communication ability:  Patient appears to be able to clearly communicate and understand verbal and written communication and follow directions correctly.  Treatment Explanation - The following has been discussed with the patient:   RX ordered/plan of care  Anticipated outcomes  Possible risks and side effects  This patient would benefit from PT intervention to resume normal activities.   Rehab potential is good.    Frequency:  1 X week, once daily; decreasing to every other week as symptoms improve and patient becomes more independent with HEP  Duration:  for 12 weeks  Discharge Plan:  Achieve all LTG.  Independent in home treatment program.  Reach maximal therapeutic benefit.    Please refer to the daily flowsheet for treatment today, total treatment time and time spent performing 1:1 timed codes.     Please Contact me with any questions or concerns. Thank you for your time and entrusting me with your care.     Osmani Olivares, PT, DPT, OCS, CSMT  Physical Therapist  [facility]  [phone]

## 2023-01-12 ENCOUNTER — THERAPY VISIT (OUTPATIENT)
Dept: PHYSICAL THERAPY | Facility: CLINIC | Age: 33
End: 2023-01-12
Attending: PHYSICIAN ASSISTANT
Payer: COMMERCIAL

## 2023-01-12 DIAGNOSIS — M25.511 RIGHT SHOULDER PAIN: Primary | ICD-10-CM

## 2023-01-12 PROCEDURE — 97110 THERAPEUTIC EXERCISES: CPT | Mod: GP | Performed by: PHYSICAL THERAPIST

## 2023-01-13 DIAGNOSIS — M25.511 CHRONIC RIGHT SHOULDER PAIN: Primary | ICD-10-CM

## 2023-01-13 DIAGNOSIS — G89.29 CHRONIC RIGHT SHOULDER PAIN: Primary | ICD-10-CM

## 2023-01-19 ENCOUNTER — THERAPY VISIT (OUTPATIENT)
Dept: PHYSICAL THERAPY | Facility: CLINIC | Age: 33
End: 2023-01-19
Attending: PHYSICIAN ASSISTANT
Payer: COMMERCIAL

## 2023-01-19 DIAGNOSIS — M25.511 ACUTE PAIN OF RIGHT SHOULDER: Primary | ICD-10-CM

## 2023-01-19 PROCEDURE — 97110 THERAPEUTIC EXERCISES: CPT | Mod: GP | Performed by: PHYSICAL THERAPIST

## 2023-01-25 ENCOUNTER — ANCILLARY PROCEDURE (OUTPATIENT)
Dept: GENERAL RADIOLOGY | Facility: CLINIC | Age: 33
End: 2023-01-25
Payer: COMMERCIAL

## 2023-01-25 ENCOUNTER — OFFICE VISIT (OUTPATIENT)
Dept: ORTHOPEDICS | Facility: CLINIC | Age: 33
End: 2023-01-25
Payer: COMMERCIAL

## 2023-01-25 DIAGNOSIS — G89.29 CHRONIC RIGHT SHOULDER PAIN: ICD-10-CM

## 2023-01-25 DIAGNOSIS — Z48.89 ENCOUNTER FOR POSTOPERATIVE CARE: Primary | ICD-10-CM

## 2023-01-25 DIAGNOSIS — M25.511 CHRONIC RIGHT SHOULDER PAIN: ICD-10-CM

## 2023-01-25 PROCEDURE — 73030 X-RAY EXAM OF SHOULDER: CPT | Mod: RT | Performed by: RADIOLOGY

## 2023-01-25 PROCEDURE — 99024 POSTOP FOLLOW-UP VISIT: CPT

## 2023-01-25 NOTE — PROGRESS NOTES
Chief Complaint:   1. Follow up, DOS 12/8/23 with Dr. Godinez    Procedures:  1.  Right shoulder arthroscopic biceps tenodesis.  2.  Right shoulder arthroscopic superior labral tear repair.  3.  Right shoulder anterior labral tear repair (arthroscopic Bankart).     Subjective:  Tomas Livingston is ~6 weeks s/p above procedures, here for follow up. He has done very well since surgery. Pain was well controlled and had regained nearly full ROM until he had a fall one week ago. Notes he has a new puppy and fell on the ice with an outstretched right arm. Endorses R anterior shoulder pain/soreness since the fall, he is still able to perform all ADLs. Has not noticed any deformity. Has not needed any medication for pain control. Has been working with PT weekly. No additional concerns today.       Exam:     General: Awake, Alert, and oriented. Articulates and communicates with a normal affect    Right Upper Extremity:  -Arthroscopic incisions well healed  -No visible deformity  -Slight tenderness to palpation over bicipital groove and pec, no AC joint tenderness   - 150 degrees active forward elevation, 160 degrees lateral elevation, 45 degrees of external rotation   -5/5 strength with forward elevation, external rotation, and abduction   -SILT median, ulnar, radial, and axillary nerve distributions   -Fingers warm and well perfused      Imaging:  Radiographs of the right shoulder from 1/25/23 were independently reviewed by me and findings were discussed with the patient today. The imaging demonstrates post op changes from bicep tenodesis, negative for fracture or dislocation.    Medications:     Current Outpatient Medications:      gabapentin (NEURONTIN) 300 MG capsule, Take 1 capsule (300 mg) by mouth 3 times daily (Patient not taking: Reported on 1/25/2023), Disp: 30 capsule, Rfl: 0     hydrOXYzine (VISTARIL) 25 MG capsule, Take 1 capsule (25 mg) by mouth 3 times daily as needed for itching (Patient not taking: Reported  on 1/25/2023), Disp: 30 capsule, Rfl: 0     ibuprofen (ADVIL/MOTRIN) 600 MG tablet, Take 1 tablet (600 mg) by mouth 3 times daily (with meals) (Patient not taking: Reported on 1/25/2023), Disp: 21 tablet, Rfl: 0     oxyCODONE (OXY-IR) 5 MG capsule, Take 1-2 capsules (5-10 mg) by mouth every 4 hours as needed for severe pain (7-10) (Patient not taking: Reported on 1/25/2023), Disp: 22 capsule, Rfl: 0    Assessment:  Doing well ~6 weeks weeks s/p R shoulder arthroscopic biceps tenodesis, superior and anterior labral repair with Dr. Godinez. Slight set back in recovery d/t fall last week,  recommended against advancing activity until returned to baseline. We discussed the plan below, all questions were answered to the best of my ability.     Plan:   -ADLs, no lifting greater than 10 lbs for 1 more week  -Advance to unrestricted 4-quadrant stretching, periscapular stabilization, and strengthening at 8 week zhanna   -Follow up scheduled with Dr. Godinez 4/17/23, happy to back sooner if needed     Ana Zamorano PA-C 1/25/2023 4:25 PM  Orthopedic Surgery

## 2023-01-25 NOTE — LETTER
1/25/2023         RE: Tomas Livingston  [address]  [place] [address]        Dear Colleague,    Thank you for referring your patient, Tomas Livingston, to the [facility] [place]. Please see a copy of my visit note below.    Chief Complaint:   1. Follow up, DOS 12/8/23 with Dr. Godinez    Procedures:  1.  Right shoulder arthroscopic biceps tenodesis.  2.  Right shoulder arthroscopic superior labral tear repair.  3.  Right shoulder anterior labral tear repair (arthroscopic Bankart).     Subjective:  Tomas Livingston is ~6 weeks s/p above procedures, here for follow up. He has done very well since surgery. Pain was well controlled and had regained nearly full ROM until he had a fall one week ago. Notes he has a new puppy and fell on the ice with an outstretched right arm. Endorses R anterior shoulder pain/soreness since the fall, he is still able to perform all ADLs. Has not noticed any deformity. Has not needed any medication for pain control. Has been working with PT weekly. No additional concerns today.       Exam:     General: Awake, Alert, and oriented. Articulates and communicates with a normal affect    Right Upper Extremity:  -Arthroscopic incisions well healed  -No visible deformity  -Slight tenderness to palpation over bicipital groove and pec, no AC joint tenderness   - 150 degrees active forward elevation, 160 degrees lateral elevation, 45 degrees of external rotation   -5/5 strength with forward elevation, external rotation, and abduction   -SILT median, ulnar, radial, and axillary nerve distributions   -Fingers warm and well perfused      Imaging:  Radiographs of the right shoulder from 1/25/23 were independently reviewed by me and findings were discussed with the patient today. The imaging demonstrates post op changes from bicep tenodesis, negative for fracture or dislocation.    Medications:     Current Outpatient Medications:      gabapentin  (NEURONTIN) 300 MG capsule, Take 1 capsule (300 mg) by mouth 3 times daily (Patient not taking: Reported on 1/25/2023), Disp: 30 capsule, Rfl: 0     hydrOXYzine (VISTARIL) 25 MG capsule, Take 1 capsule (25 mg) by mouth 3 times daily as needed for itching (Patient not taking: Reported on 1/25/2023), Disp: 30 capsule, Rfl: 0     ibuprofen (ADVIL/MOTRIN) 600 MG tablet, Take 1 tablet (600 mg) by mouth 3 times daily (with meals) (Patient not taking: Reported on 1/25/2023), Disp: 21 tablet, Rfl: 0     oxyCODONE (OXY-IR) 5 MG capsule, Take 1-2 capsules (5-10 mg) by mouth every 4 hours as needed for severe pain (7-10) (Patient not taking: Reported on 1/25/2023), Disp: 22 capsule, Rfl: 0    Assessment:  Doing well ~6 weeks weeks s/p R shoulder arthroscopic biceps tenodesis, superior and anterior labral repair with Dr. Godinez. Slight set back in recovery d/t fall last week,  recommended against advancing activity until returned to baseline. We discussed the plan below, all questions were answered to the best of my ability.     Plan:   -ADLs, no lifting greater than 10 lbs for 1 more week  -Advance to unrestricted 4-quadrant stretching, periscapular stabilization, and strengthening at 8 week zhanna   -Follow up scheduled with Dr. Godinez 4/17/23, happy to back sooner if needed     Ana Zamorano PA-C 1/25/2023 4:25 PM  Orthopedic Surgery

## 2023-01-26 ENCOUNTER — TELEPHONE (OUTPATIENT)
Dept: PHYSICAL THERAPY | Facility: CLINIC | Age: 33
End: 2023-01-26

## 2023-01-26 DIAGNOSIS — M25.511 ACUTE PAIN OF RIGHT SHOULDER: Primary | ICD-10-CM

## 2023-01-26 NOTE — TELEPHONE ENCOUNTER
Called patient regarding missed physical therapy appointment today.  Reminded them of their next scheduled appointment 2/2/23 and left clinic phone number ([phone]) with instructions to call if they needed to reschedule.

## 2023-01-31 ENCOUNTER — DOCUMENTATION ONLY (OUTPATIENT)
Dept: ORTHOPEDICS | Facility: CLINIC | Age: 33
End: 2023-01-31
Payer: COMMERCIAL

## 2023-02-02 ENCOUNTER — THERAPY VISIT (OUTPATIENT)
Dept: PHYSICAL THERAPY | Facility: CLINIC | Age: 33
End: 2023-02-02
Payer: COMMERCIAL

## 2023-02-02 DIAGNOSIS — M25.511 ACUTE PAIN OF RIGHT SHOULDER: Primary | ICD-10-CM

## 2023-02-02 PROCEDURE — 97110 THERAPEUTIC EXERCISES: CPT | Mod: GP | Performed by: PHYSICAL THERAPIST

## 2023-02-09 ENCOUNTER — THERAPY VISIT (OUTPATIENT)
Dept: PHYSICAL THERAPY | Facility: CLINIC | Age: 33
End: 2023-02-09
Payer: COMMERCIAL

## 2023-02-09 DIAGNOSIS — M25.511 ACUTE PAIN OF RIGHT SHOULDER: Primary | ICD-10-CM

## 2023-02-09 PROCEDURE — 97110 THERAPEUTIC EXERCISES: CPT | Mod: GP | Performed by: PHYSICAL THERAPIST

## 2023-02-16 ENCOUNTER — THERAPY VISIT (OUTPATIENT)
Dept: PHYSICAL THERAPY | Facility: CLINIC | Age: 33
End: 2023-02-16
Payer: COMMERCIAL

## 2023-02-16 DIAGNOSIS — M25.511 ACUTE PAIN OF RIGHT SHOULDER: Primary | ICD-10-CM

## 2023-02-16 PROCEDURE — 97110 THERAPEUTIC EXERCISES: CPT | Mod: GP | Performed by: PHYSICAL THERAPIST

## 2023-02-16 PROCEDURE — 97530 THERAPEUTIC ACTIVITIES: CPT | Mod: GP | Performed by: PHYSICAL THERAPIST

## 2023-03-02 ENCOUNTER — THERAPY VISIT (OUTPATIENT)
Dept: PHYSICAL THERAPY | Facility: CLINIC | Age: 33
End: 2023-03-02
Payer: COMMERCIAL

## 2023-03-02 DIAGNOSIS — M25.511 ACUTE PAIN OF RIGHT SHOULDER: Primary | ICD-10-CM

## 2023-03-02 PROCEDURE — 97110 THERAPEUTIC EXERCISES: CPT | Mod: GP | Performed by: PHYSICAL THERAPIST

## 2023-03-20 ENCOUNTER — THERAPY VISIT (OUTPATIENT)
Dept: PHYSICAL THERAPY | Facility: CLINIC | Age: 33
End: 2023-03-20
Payer: COMMERCIAL

## 2023-03-20 DIAGNOSIS — M25.511 ACUTE PAIN OF RIGHT SHOULDER: Primary | ICD-10-CM

## 2023-03-20 PROCEDURE — 97110 THERAPEUTIC EXERCISES: CPT | Mod: GP | Performed by: PHYSICAL THERAPIST

## 2023-03-20 PROCEDURE — 97530 THERAPEUTIC ACTIVITIES: CPT | Mod: GP | Performed by: PHYSICAL THERAPIST

## 2023-03-30 ENCOUNTER — TELEPHONE (OUTPATIENT)
Dept: PHYSICAL THERAPY | Facility: CLINIC | Age: 33
End: 2023-03-30

## 2023-03-30 DIAGNOSIS — M25.511 ACUTE PAIN OF RIGHT SHOULDER: Primary | ICD-10-CM

## 2023-04-15 NOTE — PROGRESS NOTES
Subjective:  HPI  Physical Exam                    Objective:  System    Physical Exam    General     ROS    Assessment/Plan:    Discharge  REPORT    Progress reporting period is from 1/5/2023 to 3/20/2023.       SUBJECTIVE  Subjective changes noted by patient:  .  Subjective: Has been running up to 9 miles.  Sleeps on right side, still a bit awkward.  Able to do 20 pushups.  Overall doing well.  Current pain level is NA  .     Previous pain level was   Initial Pain level: 4/10.   Changes in function:  Yes (See Goal flowsheet attached for changes in current functional level)  Adverse reaction to treatment or activity: None    OBJECTIVE  Changes noted in objective findings:  Yes,   Objective: Full flexion with mild tightness at the end range of motion.  Advance all strengthening with weight .  Able to lift #10 overhead. #20 with biceps     ASSESSMENT/PLAN  Updated problem list and treatment plan: Diagnosis 1:  S/p shoulder surgery  Pain -  hot/cold therapy, manual therapy, self management and education  Decreased ROM/flexibility - manual therapy, therapeutic exercise and home program  Decreased joint mobility - manual therapy, therapeutic exercise and home program  Decreased strength - therapeutic exercise, therapeutic activities and home program  Decreased function - therapeutic activities  STG/LTGs have been met or progress has been made towards goals:  Yes (See Goal flow sheet completed today.)  Assessment of Progress: The patient's condition is improving.  The patient's condition has potential to improve.  The patient has not returned to therapy. Current status is unknown.  Patient did not show for one visit and canceled other follow up visits.  Self Management Plans:  Patient has been instructed in a home treatment program.  I have re-evaluated this patient and find that the nature, scope, duration and intensity of the therapy is appropriate for the medical condition of the patient.  Tomas continues to require  the following intervention to meet STG and LTG's:  Patient needs to continue to work on the home exercise program.      Recommendations:  This patient is ready to be discharged from therapy and continue their home treatment program.    Please refer to the daily flowsheet for treatment today, total treatment time and time spent performing 1:1 timed codes.

## (undated) DEVICE — PREP DURAPREP 26ML APL 8630

## (undated) DEVICE — DRAPE MAYO STAND 23X54 8337

## (undated) DEVICE — DRAPE STERI U 1015

## (undated) DEVICE — Device

## (undated) DEVICE — DRSG STERI STRIP 1/2X4" R1547

## (undated) DEVICE — GLOVE PROTEXIS POWDER FREE SMT 8.5 2D72PT85X

## (undated) DEVICE — IMM KIT SHOULDER TMAX MASK FACE 7210559

## (undated) DEVICE — ABLATOR ARTHREX APOLLO RF MP90 ASPIRATING 90DEG AR-9811

## (undated) DEVICE — DRAPE SHOULDER PACK SPLITS 29365

## (undated) DEVICE — TUBING SYSTEM ARTHREX PATIENT REDEUCE AR-6421

## (undated) DEVICE — PREP DURAPREP REMOVER 4OZ 8611

## (undated) DEVICE — PACK SHOULDER CUSTOM ASC SOP15ASUMA

## (undated) DEVICE — LINEN ORTHO PACK 5446

## (undated) DEVICE — STRAP STIRRUP W/SLIP 30187-030

## (undated) DEVICE — BRUSH SURGICAL SCRUB W/4% CHG SOL 25ML 371073

## (undated) DEVICE — GLOVE PROTEXIS POWDER FREE 8.5 ORTHOPEDIC 2D73ET85

## (undated) DEVICE — SU MONOCRYL 3-0 PS-1 27" Y936H

## (undated) DEVICE — SOL HYDROGEN PEROXIDE 3% 4OZ BOTTLE F0010

## (undated) DEVICE — SU PDS II 0 CTX 36" Z370T

## (undated) DEVICE — IMM KIT SHOULDER STABILIZATION 7210573

## (undated) DEVICE — DRAPE U-POUCH 34X29" 1067

## (undated) DEVICE — SUCTION MANIFOLD NEPTUNE 2 SYS 4 PORT 0702-020-000

## (undated) RX ORDER — CEFAZOLIN SODIUM 2 G/50ML
SOLUTION INTRAVENOUS
Status: DISPENSED
Start: 2022-12-08

## (undated) RX ORDER — GABAPENTIN 300 MG/1
CAPSULE ORAL
Status: DISPENSED
Start: 2022-12-08

## (undated) RX ORDER — EPHEDRINE SULFATE 50 MG/ML
INJECTION, SOLUTION INTRAVENOUS
Status: DISPENSED
Start: 2022-12-08

## (undated) RX ORDER — ACETAMINOPHEN 325 MG/1
TABLET ORAL
Status: DISPENSED
Start: 2022-12-08

## (undated) RX ORDER — ONDANSETRON 2 MG/ML
INJECTION INTRAMUSCULAR; INTRAVENOUS
Status: DISPENSED
Start: 2022-12-08

## (undated) RX ORDER — FENTANYL CITRATE 50 UG/ML
INJECTION, SOLUTION INTRAMUSCULAR; INTRAVENOUS
Status: DISPENSED
Start: 2022-12-08

## (undated) RX ORDER — OXYCODONE HYDROCHLORIDE 5 MG/1
TABLET ORAL
Status: DISPENSED
Start: 2022-12-08

## (undated) RX ORDER — DEXAMETHASONE SODIUM PHOSPHATE 4 MG/ML
INJECTION, SOLUTION INTRA-ARTICULAR; INTRALESIONAL; INTRAMUSCULAR; INTRAVENOUS; SOFT TISSUE
Status: DISPENSED
Start: 2022-12-08